# Patient Record
Sex: MALE | Race: WHITE | NOT HISPANIC OR LATINO | Employment: OTHER | URBAN - NONMETROPOLITAN AREA
[De-identification: names, ages, dates, MRNs, and addresses within clinical notes are randomized per-mention and may not be internally consistent; named-entity substitution may affect disease eponyms.]

---

## 2017-06-12 ENCOUNTER — OFFICE VISIT (OUTPATIENT)
Dept: BARIATRICS/WEIGHT MGMT | Facility: CLINIC | Age: 52
End: 2017-06-12

## 2017-06-12 ENCOUNTER — OFFICE VISIT (OUTPATIENT)
Dept: BARIATRICS/WEIGHT MGMT | Facility: HOSPITAL | Age: 52
End: 2017-06-12

## 2017-06-12 VITALS
BODY MASS INDEX: 46.65 KG/M2 | HEIGHT: 69 IN | DIASTOLIC BLOOD PRESSURE: 71 MMHG | SYSTOLIC BLOOD PRESSURE: 118 MMHG | WEIGHT: 315 LBS | OXYGEN SATURATION: 98 % | HEART RATE: 64 BPM | RESPIRATION RATE: 18 BRPM

## 2017-06-12 DIAGNOSIS — G47.33 OBSTRUCTIVE SLEEP APNEA SYNDROME: Primary | ICD-10-CM

## 2017-06-12 DIAGNOSIS — M25.50 ARTHRALGIA, UNSPECIFIED JOINT: ICD-10-CM

## 2017-06-12 DIAGNOSIS — M54.9 BACK PAIN, UNSPECIFIED BACK LOCATION, UNSPECIFIED BACK PAIN LATERALITY, UNSPECIFIED CHRONICITY: ICD-10-CM

## 2017-06-12 DIAGNOSIS — K21.9 GASTROESOPHAGEAL REFLUX DISEASE, ESOPHAGITIS PRESENCE NOT SPECIFIED: ICD-10-CM

## 2017-06-12 DIAGNOSIS — R03.0 BORDERLINE HYPERTENSION: ICD-10-CM

## 2017-06-12 DIAGNOSIS — R06.09 DOE (DYSPNEA ON EXERTION): ICD-10-CM

## 2017-06-12 DIAGNOSIS — R53.83 FATIGUE, UNSPECIFIED TYPE: ICD-10-CM

## 2017-06-12 DIAGNOSIS — I87.2 VENOUS (PERIPHERAL) INSUFFICIENCY: ICD-10-CM

## 2017-06-12 PROCEDURE — 99204 OFFICE O/P NEW MOD 45 MIN: CPT | Performed by: SURGERY

## 2017-06-12 RX ORDER — ASPIRIN 81 MG/1
81 TABLET ORAL DAILY
COMMUNITY

## 2017-06-12 RX ORDER — OMEPRAZOLE 20 MG/1
20 CAPSULE, DELAYED RELEASE ORAL WEEKLY
COMMUNITY

## 2017-06-12 RX ORDER — BISOPROLOL FUMARATE AND HYDROCHLOROTHIAZIDE 10; 6.25 MG/1; MG/1
1 TABLET ORAL DAILY
COMMUNITY

## 2017-06-12 NOTE — PROGRESS NOTES
Patient Care Team:  Mike Wesley MD as PCP - General (General Practice)    Reason for Visit:  Surgical Weight loss    Chief Complaint   Patient presents with   • Weight Loss     Subjective     Patient is a 51 y.o. male presents with morbid obesity and his Body mass index is 58.8 kg/(m^2).. Onset of symptoms was most his life.  Symptoms are associated with weight gain.    Symptoms improve with weight loss. Co morbidities include fatigue, DURAN, sleep apnea, HTN, joint pains, low back pain, GERD, borderline diabetes, Salinas's by gross exam, leg swelling  He stated he has tried multiple weight loss regimens. He stated he has lost weight while trying these methods but failed and regained weight once he stopped the diets, usually regaining more than he lost.  Felix Hernandez is now interested in pursuing weight loss surgical options because he has not been able to maintain adequate weight loss while trying these previous conservative methods for weight loss.      He was told he might have Salinas's by gross exam during EGD done about a year ago.  No biopsy was done to confirm this though.  He is leaning toward gastric bypass at this point.  He states he can lose a lot of weight with diets over a hundred pounds but can't keep it off.    Patient has had chest pain at night thought secondary to reflux and will awaken at night with it.  It seems to be related to eating before sleep.  He has had cardiac stress tests in the past which were normal.  Review of Systems  Review of Systems -   Ears: no loss of hearing, Nose: no chronic bleeding, Eyes: no blurry vision, Throat: no chronic sore throat, Neck: no hoarseness or dysphagia, Respiratory: no hemoptysis, Heart: No chronic chest pain or irregular heart beat, GI: no chronic N/V, hematemesis, chronic diarrhea or hematochezia, : No hematuria or difficulty voiding, Musculoskeletal: No weakness, or claudication, Neuro: No numbness or tingling in extremities, Skin: No  rashes or skin discoloration, Constitutional: No unexplained night sweats, fever or chills, Endocrine: No cold intolerance   Pertinent Positives include: fatigue, DURAN, sleep apnea, HTN, joint pains, low back pain, GERD, borderline diabetes, Salinas's by gross exam, leg swelling  A complete patient filled ROS was done on 6/12/17 and is in the medical records.  All other systems reviewed and are negative     History  No Known Allergies  Current Outpatient Prescriptions   Medication Sig Dispense Refill   • aspirin 81 MG EC tablet Take 81 mg by mouth Daily.     • bisoprolol-hydrochlorothiazide (ZIAC) 10-6.25 MG per tablet Take 1 tablet by mouth Daily.     • DICLOFENAC PO Take  by mouth As Needed.     • metFORMIN (GLUCOPHAGE) 1000 MG tablet Take 1,000 mg by mouth 2 (Two) Times a Day With Meals.     • omeprazole (priLOSEC) 20 MG capsule Take 20 mg by mouth Daily.       No current facility-administered medications for this visit.      Past Surgical History:   Procedure Laterality Date   • APPENDECTOMY       Past Medical History:   Diagnosis Date   • Ankle swelling    • Salinas's esophagus    • Carpal tunnel syndrome    • Generalized pain     joint, knee, carpal tunnel   • GERD (gastroesophageal reflux disease)    • Hair loss    • Heart burn    • Hypertension    • Insomnia    • Night sweats    • No energy    • Pre-diabetes    • Rapid heart beat    • Recurrent boils    • Restless leg syndrome    • Sleep apnea    • Sleep apnea with use of continuous positive airway pressure (CPAP)    • Snoring    • Weight gain      Social History   Substance Use Topics   • Smoking status: Former Smoker     Years: 5.00     Types: Cigarettes     Quit date: 1997   • Smokeless tobacco: Never Used   • Alcohol use Yes      Comment: 1-2 month     Family History   Problem Relation Age of Onset   • Obesity Mother    • Diabetes Mother 27   • Hypertension Mother    • Sleep apnea Mother    • Obesity Father    • Diabetes Father 65   • Hypertension Father     • Other Father    • Stroke Maternal Grandmother 80   • Obesity Maternal Grandfather    • Diabetes Maternal Grandfather    • Hypertension Maternal Grandfather    • Heart attack Maternal Grandfather    • Obesity Paternal Grandmother    • Diabetes Paternal Grandmother    • Hypertension Paternal Grandmother    • Heart attack Paternal Grandmother    • Other Paternal Grandmother    • Lung cancer Paternal Grandfather        Objective     Vital Signs  Heart Rate:  [64] 64  Resp:  [18] 18  BP: (118)/(71) 118/71  Body mass index is 58.8 kg/(m^2).    Physical Exam:    A&O x 3,Well developed, well nourished patient in no acute distress, with normal affect   HEENT: normocephalic, EOMI, PERRLA, mouth mucosa pink and moist, white conjunctiva, no ptosis, normal dentition for age  Neck: Full ROM, trachea midline, no thyromegaly  Respiratory: CTA with normal respiratory effort  Chest: (breast)  Cardiovascular: RRR, normal PMI  GI: Soft, nontender with no hepatospenomegaly, normal BS  Genitourinary: (hernia)  Musculoskeletal: Normal gait and station, digits with no cyanosis  Skin: No ulcers or rashes, warm and dry  Lymphatics:   Neurologic:   Psychiatric:          Results Review:   I reviewed the patient's new clinical results.  This patient will have preop blood testing including CBC with diff, CMP, INR and PT, U/A, Type and screen.  This patient will have CXR, UGI with SBFT if gastric bypass is planned.  The patient will have a preop EGD performed to check for H. Pylori and any  significant evidence of reflux esophagitis.  This patient will have an EKG and possible cardiology consult if super obese.  The patient will have screening for sleep apnea and testing will be done if appropriate.  This patient will have PFT's and ABG done as well.  If the HgbA1C is elevated the patient will be referred to endocrine as well.    Patient will also need to have cardiac, psychological, dietary, pulmonary, gastroenterological clearance.    I  have reviewed the medical chart and this is my summary.  Patient with borderline diabetes, HTN, Salinas's, GERD, sleep apnea interested in bariatric surgery. He had an appendectomy in the remote past done laparoscopically.  He states his fasting sugar was 118 recently.  He is on Metformin for weight loss and help with sugars.      Assessment/Plan   Encounter Diagnoses   Name Primary?   • Obstructive sleep apnea syndrome Yes   • Venous (peripheral) insufficiency    • Body mass index (BMI) of 50-59.9 in adult    • Gastroesophageal reflux disease, esophagitis presence not specified    • Borderline hypertension    • Fatigue, unspecified type    • DURAN (dyspnea on exertion)    • Arthralgia, unspecified joint    • Back pain, unspecified back location, unspecified back pain laterality, unspecified chronicity      These bariatric surgery patients are universally considered major, high risk open or laparosocopic surgeries with identified risk factors or co morbidites listed above.  All listed comorbidities will lead to increased operative risks as the following outlines.  This is why bariatric surgeries are considered high risk and why the co morbidities are listed even if stable. Unstable or worsening co morbidities would likely lead to postponement of surgery until they are stable.    Morbid obesity is an independent risk factor for surgery complications.  There is a higher risk of blood loss, operative time, wound infection (1.7x higher), heart attacks (5x higher), positional neuropathy (4x higher), Urinary tract infection (1.5x higher), death (2x higher).    If a patient has diabetes, there is a higher risk of infection and poor wound healing that could lead to anastomotic leaks.  There is a higher risk for these complications and for DKA in uncontrolled DM or with a Hgb A1C over 7.4. Patients with diabetes may develop PVD, CAD, nephropathy, gastropathy, neuropathy, retinopathy and strokes. It may be due to micovascular  disease from the diabetes along with atherosclerotic disease. Many of these diabetic complications will lead to higher operative risks.    Patients on NSAID are at increased risk for PUD and poor wound healing and bleeding.  This includes anastomotic leaks of all kinds which could lead to death. Many of these patient's are on these medications for DJD or arthritis or joint pains, see below.    A patient with Hypertension has a higher risk for cardiac complications including MI, CHF and also for stroke.  There is also a risk of renal failure from any drop of systolic pressure of 60 or more due to anesthetic medications or blood loss or the combination of both. Patients who are on Metformin for diabetes are also at risk for reversible kidney damage. Any organ failure can lead to higher perioperative risks. Multiple organ failure in the postoperative period can lead to death.    A patient with sleep apnea is at increased risk for sudden death and pulmonary complications.  It is estimated that 80% of morbidly obese patients have sleep apnea and many undiagnosed or not treated. Many have snoring as the lone symptom.    Patients with GERD and reflux are at increased risk for aspiration and pneumonia/bronchitis after surgery. A patient with GERD may have reactive airway disease or asthma.    Patients with venous insufficiency or varicose veins or prior history of VTE are at higher risk of VTE including DVT or PE.  Venous insufficiency and chronic swelling of the legs are common in the morbidly obese.  PE is the number one cause of death after surgery in the morbidly obese,  GI leaks is the second leading cause for death.    Patients with joint pains, low back pains, neuropathy, wheel chair bound or with prior history of stroke may have mobility issues which may limit their ability to walk in the postoperative period which would help prevent VTE including DVT and PE. Patients who are on NSAID, MYERS-2 inhibitors and aspirin  are at risk for bleeding, PUD, poor wound healing including to any anastomoses, and reversible injury to the kidney.  These patients are usually on these medication for pain.    Patients who have fatty liver from morbid obesity can develop liver dysfunction and even cirrhosis.  This can lead to coagulopathy and increased risk of bleeding.  A healthy liver is also needed for proper metabolism of many drugs including anesthetics.  The liver also produces albumin which is necessary to maintain oncotic pressure in the intravascular volume and to prevent edema which might interfere with proper healing especially for anastomoses.  Cirrhosis can lead to portal vein hypertension and varices which might cause life threatening bleeding.    Restrictive lung disease is common in the morbidly obese and  is due to poor external compliance of the chest wall due to weight.  It is associated with a decreased FVC, a normal FEV1 and normal DLCO.  It is associated with higher rates of ICU admission, possible intubation or need for tracheostomy.    Patients with a short thick neck and inability to open mouth or extend neck and a Mallampati class IV would be difficult to intubate.  This patient would be high risk for anesthetic complications.    The above listed comorbidites are considered stable, unless otherwise stated or implied as being unstable or worsening in the patients's impression/plan discussion.        This patient is interested in bariatric surgery.  He has a lot of comorbidities including metabolic syndrome (HTN, hyperlipidemia, and borderline diabetes).  Because of his Salinas's he would not be a good candidate for gastric bypass but he never had biopsy to confirm this with diagnosis by gross visual exam only.  Because of his borderline diabetes and Salians's, he would be a better candidate for gastric bypass.  He will need to complete a 6 month medically supervised diet and he will see Kathy our APRN next month.  He  will eventually need a psychological evaluation and if he has not had an EGD recently, this will need to be repeated to monitor any progression of his Salinas's and to confirm this diagnosis by biopsy.    I discussed the patients findings and my recommendations with patient and family. I will obtain any pertinent old medical records for this patient.     Abhinav Ricks MD    06/12/17  3:30 PM  Patient Care Team:  Mike Wesley MD as PCP - General (General Practice)

## 2017-06-12 NOTE — PROGRESS NOTES
"Nutrition Bariatric/[facility] Note     Visit   Initial Assessment     Anthropometrics   Height: 69\"  Weight: 398.2#  BMI: 58.8    Waist: 64.5\"  Hip: 61\"  Chest: 61.5\"  Thigh: 33\"  Arm: 17\"  Body Fat: 45.9%    Nutrition Recall  24 Hour recall:  (B) protein shake (L) fast food, unsweetened or sweet tea (D) fast food or salad w/chicken or ham, water  Eating 3 meals daily   Snacking in evening on sweets  Excessive sweet intake  Large portions  Drinking with meals   Drinking greater to or equal to 64 fluid ounces  Replacing 1 meal with protein shake daily     Exercise   None    Habits:  None    Education    Goal Setting and Information Packet    Nutrition Goals   Continue diet changes  Eat 3-4 meals per day with protein  Eat protein first at meals  Eliminate snacks  Healthier food choices  Portion control / Use smaller plate or measuring cup   Replace sugar beverages with artifical sweetened     Exercise Goals  Add 15-30 minutes of walking, cycling, elliptical, swimming, chair, yoga or crossfit daily    Maria T Mcneil RD, LD  06/12/2017  2:57 PM    "

## 2017-07-17 ENCOUNTER — OFFICE VISIT (OUTPATIENT)
Dept: BARIATRICS/WEIGHT MGMT | Facility: CLINIC | Age: 52
End: 2017-07-17

## 2017-07-17 ENCOUNTER — APPOINTMENT (OUTPATIENT)
Dept: LAB | Facility: HOSPITAL | Age: 52
End: 2017-07-17

## 2017-07-17 VITALS
OXYGEN SATURATION: 99 % | BODY MASS INDEX: 46.65 KG/M2 | DIASTOLIC BLOOD PRESSURE: 85 MMHG | HEIGHT: 69 IN | SYSTOLIC BLOOD PRESSURE: 137 MMHG | WEIGHT: 315 LBS | HEART RATE: 59 BPM

## 2017-07-17 DIAGNOSIS — Z71.89 PRE-BARIATRIC SURGERY PSYCHOLOGICAL EVALUATION: ICD-10-CM

## 2017-07-17 DIAGNOSIS — E66.9 SUPER OBESE: Primary | ICD-10-CM

## 2017-07-17 DIAGNOSIS — R53.83 FATIGUE, UNSPECIFIED TYPE: ICD-10-CM

## 2017-07-17 LAB
25(OH)D3 SERPL-MCNC: 28.1 NG/ML (ref 30–100)
TSH SERPL DL<=0.05 MIU/L-ACNC: 1.42 MIU/ML (ref 0.47–4.68)
VIT B12 BLD-MCNC: 675 PG/ML (ref 239–931)

## 2017-07-17 PROCEDURE — 82306 VITAMIN D 25 HYDROXY: CPT | Performed by: NURSE PRACTITIONER

## 2017-07-17 PROCEDURE — 99213 OFFICE O/P EST LOW 20 MIN: CPT | Performed by: NURSE PRACTITIONER

## 2017-07-17 PROCEDURE — 82607 VITAMIN B-12: CPT | Performed by: NURSE PRACTITIONER

## 2017-07-17 PROCEDURE — 84443 ASSAY THYROID STIM HORMONE: CPT | Performed by: NURSE PRACTITIONER

## 2017-07-17 PROCEDURE — 36415 COLL VENOUS BLD VENIPUNCTURE: CPT | Performed by: NURSE PRACTITIONER

## 2017-07-17 NOTE — PATIENT INSTRUCTIONS
Felix Hernandez has done well this month with healthy changes. Patient has lost 7 pounds. Today we discussed healthy changes in lifestyle, diet, and exercise.   Handout provided on portion sizes/control.   Intensive behavioral therapy for obesity was done today.   Goals for this month are: 3 meals per day with protein at each; eat protein first, use smaller plate; exercise as advised.  Keep trying to re-obtain CPAP machine for [facility].  Obtain labs today. Office will call with results.   Psych referral made today for clearance prior to surgery.  Will need cardiac clearance prior to surgery (age over 50 and BMI over 50).  Follow up in one month for a weight recheck.

## 2017-07-17 NOTE — PROGRESS NOTES
"Subjective   Felix Hernandez is a 51 y.o. male.     History of Present Illness   Felix Hernandez is here with morbid obesity and desires to have surgery for weight loss. This is the patient's 2nd visit to the office.  Patient will be given a referral today for psych clearance prior to surgery.  Patient has been more active this month. 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 Patient has been making health dietary choices and eating 3 meals per day with protein at each. Patient has been getting about 70 grams of protein per day. Patient is drinking 64 ounces of water per day.   Vitals:    07/17/17 1444   BP: 137/85   BP Location: Right arm   Patient Position: Sitting   Cuff Size: Adult   Pulse: 59   SpO2: 99%   Weight: (!) 391 lb 12.8 oz (178 kg)   Height: 69\" (175.3 cm)         The following portions of the patient's history were reviewed and updated as appropriate: allergies, current medications, past family history, past medical history, past social history, past surgical history and problem list.    Review of Systems   Constitutional: Negative for activity change, appetite change and fatigue.   HENT: Negative.    Eyes: Negative.    Respiratory: Positive for apnea. Negative for cough, chest tightness and shortness of breath.         Is in process of getting his CPAP machine back   Cardiovascular: Positive for leg swelling. Negative for chest pain and palpitations.   Gastrointestinal: Negative.  Negative for abdominal pain, anal bleeding, constipation, nausea and vomiting.        Heartburn; may or may not have Salinas's esophagus   Endocrine: Negative.    Genitourinary: Negative.    Musculoskeletal: Positive for arthralgias. Negative for back pain.        Knee pain; just had steroid injection into knee; needs knee replacement   Skin: Negative.    Allergic/Immunologic: Negative.    Neurological: Negative.  Negative for seizures and syncope.   Hematological: Negative.  " Does not bruise/bleed easily.   Psychiatric/Behavioral: Positive for sleep disturbance. Negative for dysphoric mood and self-injury.       Objective   Physical Exam   Constitutional: He is oriented to person, place, and time. Vital signs are normal. He appears well-developed and well-nourished. He is cooperative. No distress.   HENT:   Head: Normocephalic and atraumatic.   Nose: Nose normal.   Mouth/Throat: Oropharynx is clear and moist. No oropharyngeal exudate or tonsillar abscesses.   Eyes: Conjunctivae, EOM and lids are normal. Pupils are equal, round, and reactive to light. Right eye exhibits no discharge. Left eye exhibits no discharge.   Neck: Trachea normal. Neck supple. No JVD present. Carotid bruit is not present. No rigidity. No tracheal deviation present. No thyromegaly present.   Cardiovascular: Normal rate, regular rhythm, S1 normal, S2 normal and normal heart sounds.    Pulmonary/Chest: Effort normal. No stridor. No respiratory distress. He has decreased breath sounds in the right upper field, the right middle field, the right lower field, the left upper field, the left middle field and the left lower field. He has no wheezes. He has no rales.   Abdominal: Soft. Bowel sounds are normal. He exhibits no distension. There is no tenderness.   Obese; healed scar noted   Musculoskeletal: He exhibits no edema.        Right shoulder: He exhibits normal strength.   Lymphadenopathy:     He has no cervical adenopathy.   Neurological: He is alert and oriented to person, place, and time. He has normal strength. No cranial nerve deficit.   Skin: Skin is warm, dry and intact. No rash noted.   Psychiatric: He has a normal mood and affect. His speech is normal and behavior is normal.   Alert and oriented x 3   Vitals reviewed.      Assessment/Plan   Felix was seen today for follow-up.    Diagnoses and all orders for this visit:    Super obese  -     Vitamin B12  -     Vitamin D 25 Hydroxy  -     TSH  -     Bariatric  Nutritional Counseling; Standing  -     Ambulatory Referral to Psychology    Pre-bariatric surgery psychological evaluation  -     Vitamin B12  -     Vitamin D 25 Hydroxy  -     TSH  -     Ambulatory Referral to Psychology    Fatigue, unspecified type  -     Vitamin B12  -     Vitamin D 25 Hydroxy  -     TSH          Felix Hernandez has done well this month with healthy changes. Patient has lost 7 pounds. Today we discussed healthy changes in lifestyle, diet, and exercise.   Handout provided on portion sizes/control.   Intensive behavioral therapy for obesity was done today.   Goals for this month are: 3 meals per day with protein at each; eat protein first, use smaller plate; exercise as advised.  Keep trying to re-obtain CPAP machine for SHANTELLE.  Obtain labs today. Office will call with results.   Psych referral made today for clearance prior to surgery.  Will need cardiac clearance prior to surgery (age over 50 and BMI over 50).  Follow up in one month for a weight recheck.

## 2017-07-18 DIAGNOSIS — E55.9 VITAMIN D INSUFFICIENCY: Primary | ICD-10-CM

## 2017-07-18 RX ORDER — ERGOCALCIFEROL 1.25 MG/1
50000 CAPSULE ORAL WEEKLY
Qty: 4 CAPSULE | Refills: 3 | Status: SHIPPED | OUTPATIENT
Start: 2017-07-18 | End: 2017-10-16 | Stop reason: SDUPTHER

## 2017-08-11 ENCOUNTER — RESULTS ENCOUNTER (OUTPATIENT)
Dept: BARIATRICS/WEIGHT MGMT | Facility: CLINIC | Age: 52
End: 2017-08-11

## 2017-08-11 DIAGNOSIS — E66.9 SUPER OBESE: ICD-10-CM

## 2017-08-21 ENCOUNTER — OFFICE VISIT (OUTPATIENT)
Dept: BARIATRICS/WEIGHT MGMT | Facility: CLINIC | Age: 52
End: 2017-08-21

## 2017-08-21 VITALS
BODY MASS INDEX: 46.65 KG/M2 | WEIGHT: 315 LBS | OXYGEN SATURATION: 99 % | SYSTOLIC BLOOD PRESSURE: 140 MMHG | DIASTOLIC BLOOD PRESSURE: 89 MMHG | HEIGHT: 69 IN | TEMPERATURE: 97.5 F | HEART RATE: 65 BPM

## 2017-08-21 PROCEDURE — 99213 OFFICE O/P EST LOW 20 MIN: CPT | Performed by: SURGERY

## 2017-08-21 NOTE — PROGRESS NOTES
Patient Care Team:  Mike Wesley MD as PCP - General (General Practice)    Reason for Visit:  Surgical Weight loss    Subjective     Patient is a 51 y.o. male presents with morbid obesity and his Body mass index is 58.45 kg/(m^2)..     He is here for discussion of surgical weight loss options.  He stated he has been with the disease of obesity for year(s).  He stated he suffers from hypertension, sleep apnea and reflux due to his weight gain.  He stated that he loss helps alleviate these symptoms.  He admits to losing approximately 100 pounds however regaining it over time.   He stated that he has tried multiple diet regimens including currently participating in a medically supervised weight loss program to help with weight loss.  He stated that he has attempted these conservative methods for weight loss without maintaining long term success.  Today he would like to discuss surgical weight loss options such as the Laparoscopic Sleeve Gastrectomy or the Laparoscopic R - Y Gastric Bypass.     Review of Systems  General ROS: positive for  - weight gain  Psychological ROS: positive for - sleep disturbances  Respiratory ROS: no cough, shortness of breath, or wheezing  Cardiovascular ROS: no chest pain or dyspnea on exertion  Gastrointestinal ROS: no abdominal pain, change in bowel habits, or black or bloody stools  positive for - heartburn  Musculoskeletal ROS: positive for - joint pain and pain in back - lower  Neurological ROS: no TIA or stroke symptoms    History  Past Medical History:   Diagnosis Date   • Ankle swelling    • Garsia's esophagus     possible garsia's, but nothing definitive; he is having follow up regarding this   • Carpal tunnel syndrome    • Generalized pain     joint, knee, carpal tunnel   • GERD (gastroesophageal reflux disease)    • Hair loss    • Heart burn    • Hypertension    • Insomnia    • Night sweats    • No energy    • Pre-diabetes    • Rapid heart beat    • Recurrent boils    •  Restless leg syndrome    • Sleep apnea    • Sleep apnea with use of continuous positive airway pressure (CPAP)    • Snoring    • Weight gain      Past Surgical History:   Procedure Laterality Date   • APPENDECTOMY      lap     Family History   Problem Relation Age of Onset   • Obesity Mother    • Diabetes Mother 27   • Hypertension Mother    • Sleep apnea Mother    • Obesity Father    • Diabetes Father 65   • Hypertension Father    • Other Father    • Stroke Maternal Grandmother 80   • Obesity Maternal Grandfather    • Diabetes Maternal Grandfather    • Hypertension Maternal Grandfather    • Heart attack Maternal Grandfather    • Obesity Paternal Grandmother    • Diabetes Paternal Grandmother    • Hypertension Paternal Grandmother    • Heart attack Paternal Grandmother    • Other Paternal Grandmother    • Lung cancer Paternal Grandfather      Social History   Substance Use Topics   • Smoking status: Former Smoker     Years: 5.00     Types: Cigarettes     Quit date: 1997   • Smokeless tobacco: Never Used   • Alcohol use 1.2 oz/week     2 Cans of beer per week      Comment: 1-2 month       (Not in a hospital admission)  Allergies:  Review of patient's allergies indicates no known allergies.    Objective     Vital Signs  Temp:  [97.5 °F (36.4 °C)] 97.5 °F (36.4 °C)  Heart Rate:  [65] 65  BP: (140)/(89) 140/89  Body mass index is 58.45 kg/(m^2).  Last 3 weights    08/21/17  0850   Weight: (!) 395 lb 12.8 oz (180 kg)       Physical Exam:     HEENT: extra ocular movement intact  Respiratory: appears well, vitals normal, no respiratory distress, acyanotic, normal RR, chest clear, no wheezing, crepitations, rhonchi, normal symmetric air entry  Cardiovascular: Regular rate and rhythm, S1, S2 normal, no murmur, click, rub or gallop  GI: Soft, non-tender, normal bowel sounds; no bruits, organomegaly or masses.  Abnormal shape: obese         Results Review:   None        Assessment/Plan   Encounter Diagnoses   Name Primary?   •  Body mass index (BMI) of 50-59.9 in adult Yes       1.  I believe this patient will be a good candidate for weight loss surgery.  I have discussed the Felix - Y Gastric Bypass, laparoscopic sleeve gastrectomy and the Laparoscopic Gastric Band procedures.  We discussed the benefits of the surgeries including the benefit of weight loss and the possible reversal of co-morbid conditions associated with morbid obesity. I explained to the patient that prior to making a definitive decision on the type of surgery he will require an esophagogastroduodenoscopy.  The alternatives  include not doing anything, or pursuing an UGI series which only offers a diagnosis with potential less accuracy compared to EGD. The benefits of the EGD such as identifying the pathology and anatomy of the upper GI system and the complications and risks of the procedure.  The risk of the endoscopy were discussed in detail. We discussed the risk of perforation (one out of 6794-8154, riskier with dilation), bleeding (one out of 500), and the rare risks of infection, adverse reaction to anesthesia, respiratory failure, cardiac failure including MI and adverse reaction to medications, etc. We discussed consequences that could occur if a risk were to develop such as the need for hospitalization, blood transfusion, surgical intervention, medications, pain, disability and death. The patient verbalizes understanding and agrees to proceed. such as bleeding, perforation, swallowing difficulties and gas bloat can occur after this procedure.  Upon completion of our discussion and addressing and answering his questions to his satisfation, informed consent was obtained.  He will be scheduled accordingly for the esophagogastroduodenoscopy procedure.    I discussed the patients findings and my recommendations with patient and his wife.     Dr. Jorge Jasso MD [facility]    08/21/17  10:32 AM  Patient Care Team:  Mike Wesley MD as PCP - General (General Practice)

## 2017-08-28 ENCOUNTER — OFFICE VISIT (OUTPATIENT)
Dept: PSYCHIATRY | Age: 52
End: 2017-08-28
Payer: MEDICAID

## 2017-08-28 VITALS
HEART RATE: 71 BPM | HEIGHT: 69 IN | SYSTOLIC BLOOD PRESSURE: 145 MMHG | BODY MASS INDEX: 46.65 KG/M2 | DIASTOLIC BLOOD PRESSURE: 82 MMHG | OXYGEN SATURATION: 96 % | WEIGHT: 315 LBS

## 2017-08-28 DIAGNOSIS — F43.20 ADJUSTMENT DISORDER, UNSPECIFIED TYPE: Primary | ICD-10-CM

## 2017-08-28 PROCEDURE — 99999 PR OFFICE/OUTPT VISIT,PROCEDURE ONLY: CPT | Performed by: COUNSELOR

## 2017-08-28 PROCEDURE — 90791 PSYCH DIAGNOSTIC EVALUATION: CPT | Performed by: COUNSELOR

## 2017-08-28 RX ORDER — OMEPRAZOLE 20 MG/1
20 CAPSULE, DELAYED RELEASE ORAL
COMMUNITY

## 2017-08-28 RX ORDER — BISOPROLOL FUMARATE AND HYDROCHLOROTHIAZIDE 10; 6.25 MG/1; MG/1
1 TABLET ORAL
COMMUNITY

## 2017-09-08 ENCOUNTER — RESULTS ENCOUNTER (OUTPATIENT)
Dept: BARIATRICS/WEIGHT MGMT | Facility: CLINIC | Age: 52
End: 2017-09-08

## 2017-09-08 DIAGNOSIS — E66.9 SUPER OBESE: ICD-10-CM

## 2017-09-12 ENCOUNTER — ANESTHESIA (OUTPATIENT)
Dept: GASTROENTEROLOGY | Facility: HOSPITAL | Age: 52
End: 2017-09-12

## 2017-09-12 ENCOUNTER — HOSPITAL ENCOUNTER (OUTPATIENT)
Facility: HOSPITAL | Age: 52
Setting detail: HOSPITAL OUTPATIENT SURGERY
Discharge: HOME | End: 2017-09-12
Attending: SURGERY | Admitting: SURGERY

## 2017-09-12 ENCOUNTER — ANESTHESIA EVENT (OUTPATIENT)
Dept: GASTROENTEROLOGY | Facility: HOSPITAL | Age: 52
End: 2017-09-12

## 2017-09-12 VITALS
TEMPERATURE: 97.4 F | DIASTOLIC BLOOD PRESSURE: 91 MMHG | HEART RATE: 78 BPM | OXYGEN SATURATION: 99 % | SYSTOLIC BLOOD PRESSURE: 151 MMHG | HEIGHT: 69 IN | RESPIRATION RATE: 14 BRPM | WEIGHT: 315 LBS | BODY MASS INDEX: 46.65 KG/M2

## 2017-09-12 PROCEDURE — 43239 EGD BIOPSY SINGLE/MULTIPLE: CPT | Performed by: SURGERY

## 2017-09-12 PROCEDURE — 88305 TISSUE EXAM BY PATHOLOGIST: CPT | Performed by: SURGERY

## 2017-09-12 PROCEDURE — 25010000002 PROPOFOL 10 MG/ML EMULSION: Performed by: NURSE ANESTHETIST, CERTIFIED REGISTERED

## 2017-09-12 PROCEDURE — 87081 CULTURE SCREEN ONLY: CPT | Performed by: SURGERY

## 2017-09-12 RX ORDER — SODIUM CHLORIDE 9 MG/ML
100 INJECTION, SOLUTION INTRAVENOUS CONTINUOUS
Status: CANCELLED | OUTPATIENT
Start: 2017-09-12

## 2017-09-12 RX ORDER — SODIUM CHLORIDE 0.9 % (FLUSH) 0.9 %
3 SYRINGE (ML) INJECTION AS NEEDED
Status: DISCONTINUED | OUTPATIENT
Start: 2017-09-12 | End: 2017-09-12 | Stop reason: HOSPADM

## 2017-09-12 RX ORDER — SODIUM CHLORIDE 9 MG/ML
500 INJECTION, SOLUTION INTRAVENOUS CONTINUOUS PRN
Status: DISCONTINUED | OUTPATIENT
Start: 2017-09-12 | End: 2017-09-12 | Stop reason: HOSPADM

## 2017-09-12 RX ORDER — PROPOFOL 10 MG/ML
VIAL (ML) INTRAVENOUS AS NEEDED
Status: DISCONTINUED | OUTPATIENT
Start: 2017-09-12 | End: 2017-09-12 | Stop reason: SURG

## 2017-09-12 RX ORDER — LIDOCAINE HYDROCHLORIDE 20 MG/ML
INJECTION, SOLUTION INFILTRATION; PERINEURAL AS NEEDED
Status: DISCONTINUED | OUTPATIENT
Start: 2017-09-12 | End: 2017-09-12 | Stop reason: SURG

## 2017-09-12 RX ORDER — SODIUM CHLORIDE 0.9 % (FLUSH) 0.9 %
1-10 SYRINGE (ML) INJECTION AS NEEDED
Status: CANCELLED | OUTPATIENT
Start: 2017-09-12

## 2017-09-12 RX ORDER — METOPROLOL TARTRATE 5 MG/5ML
2 INJECTION INTRAVENOUS ONCE AS NEEDED
Status: COMPLETED | OUTPATIENT
Start: 2017-09-12 | End: 2017-09-12

## 2017-09-12 RX ADMIN — LIDOCAINE HYDROCHLORIDE 40 MG: 20 INJECTION, SOLUTION INFILTRATION; PERINEURAL at 09:19

## 2017-09-12 RX ADMIN — METOPROLOL TARTRATE: 1 INJECTION, SOLUTION INTRAVENOUS at 08:44

## 2017-09-12 RX ADMIN — SODIUM CHLORIDE 500 ML: 9 INJECTION, SOLUTION INTRAVENOUS at 08:44

## 2017-09-12 RX ADMIN — PROPOFOL 50 MG: 10 INJECTION, EMULSION INTRAVENOUS at 09:20

## 2017-09-12 RX ADMIN — PROPOFOL 30 MG: 10 INJECTION, EMULSION INTRAVENOUS at 09:25

## 2017-09-12 RX ADMIN — PROPOFOL 50 MG: 10 INJECTION, EMULSION INTRAVENOUS at 09:22

## 2017-09-12 RX ADMIN — PROPOFOL 40 MG: 10 INJECTION, EMULSION INTRAVENOUS at 09:24

## 2017-09-12 NOTE — PLAN OF CARE
Problem: Patient Care Overview (Adult)  Goal: Plan of Care Review  Outcome: Ongoing (interventions implemented as appropriate)    09/12/17 0916   Patient Care Overview   Progress improving   Outcome Evaluation   Outcome Summary/Follow up Plan no noted problems

## 2017-09-12 NOTE — PLAN OF CARE
Problem: Patient Care Overview (Adult)  Goal: Plan of Care Review  Outcome: Outcome(s) achieved Date Met:  09/12/17 09/12/17 0942   Patient Care Overview   Progress improving   Outcome Evaluation   Outcome Summary/Follow up Plan D/C CRITERIA MET   Coping/Psychosocial Response Interventions   Plan Of Care Reviewed With patient;spouse       Goal: Adult Individualization and Mutuality  Outcome: Outcome(s) achieved Date Met:  09/12/17

## 2017-09-12 NOTE — ANESTHESIA PREPROCEDURE EVALUATION
Anesthesia Evaluation     Patient summary reviewed   no history of anesthetic complications:  NPO Solid Status: > 8 hours  NPO Liquid Status: > 8 hours     Airway   Mallampati: III  TM distance: >3 FB  Neck ROM: full  no difficulty expected  Dental          Pulmonary    (+) shortness of breath, sleep apnea on CPAP,   (-) asthma, not a smoker  Cardiovascular     Patient on routine beta blocker and Beta blocker not taken-may be given intraoperatively    (+) hypertension, DURAN,       Neuro/Psych  (-) seizures, TIA, CVA  GI/Hepatic/Renal/Endo    (+)  GERD, diabetes mellitus,     Musculoskeletal     (+) back pain,   Abdominal    Substance History      OB/GYN          Other                                        Anesthesia Plan    ASA 3     general   total IV anesthesia  intravenous induction   Anesthetic plan and risks discussed with patient.

## 2017-09-12 NOTE — BRIEF OP NOTE
ESOPHAGOGASTRODUODENOSCOPY WITH ANESTHESIA  Procedure Note    Felix Hernandez  9/12/2017    Pre-op Diagnosis:   Body mass index (BMI) of 50-59.9 in adult [Z68.43]    Post-op Diagnosis:     Post-Op Diagnosis Codes:     * Body mass index (BMI) of 50-59.9 in adult [Z68.43]    Procedure/CPT® Codes:      Procedure(s):  ESOPHAGOGASTRODUODENOSCOPY WITH ANESTHESIA    Surgeon(s):  Jorge Jasso MD    Anesthesia: Sedation    Staff:   Endo Technician: Karl Farris  Endo Nurse: Lucero Jeffery RN    Estimated Blood Loss: *No blood loss documented*  Urine Voided: * No values recorded between 9/12/2017  9:16 AM and 9/12/2017  9:24 AM *    Specimens:                  ID Type Source Tests Collected by Time Destination   1 : gerson test Tissue Stomach UREASE FOR H PYLORI, 24 HR Jorge Jasso MD 9/12/2017 0906    A : GE junction bx Tissue Esophagus TISSUE EXAM Jorge Jasso MD 9/12/2017 0907          Findings: wnl    Complications: none      Jorge Jasso MD     Date: 9/12/2017  Time: 9:28 AM

## 2017-09-12 NOTE — PLAN OF CARE
Problem: GI Endoscopy (Adult)  Goal: Signs and Symptoms of Listed Potential Problems Will be Absent or Manageable (GI Endoscopy)  Outcome: Outcome(s) achieved Date Met:  09/12/17

## 2017-09-12 NOTE — ANESTHESIA POSTPROCEDURE EVALUATION
Patient: Felix Hernandez    Procedure Summary     Date Anesthesia Start Anesthesia Stop Room / Location    09/12/17 0918 0926 [facility] ENDOSCOPY 5 / [facility]       Procedure Diagnosis Surgeon Provider    ESOPHAGOGASTRODUODENOSCOPY WITH ANESTHESIA (N/A Esophagus) Body mass index (BMI) of 50-59.9 in adult  (Body mass index (BMI) of 50-59.9 in adult [Z68.43]) MD Marek Butts CRNA          Anesthesia Type: general  Last vitals  BP        Temp        Pulse       Resp        SpO2          Post Anesthesia Care and Evaluation    Patient location during evaluation: PHASE II  Patient participation: complete - patient participated  Level of consciousness: awake and alert  Pain score: 0  Pain management: adequate  Airway patency: patent  Anesthetic complications: No anesthetic complications  PONV Status: none  Cardiovascular status: acceptable  Respiratory status: acceptable  Hydration status: acceptable  No anesthesia care post op

## 2017-09-12 NOTE — H&P (VIEW-ONLY)
Patient Care Team:  Mike Wesley MD as PCP - General (General Practice)    Reason for Visit:  Surgical Weight loss    Subjective     Patient is a 51 y.o. male presents with morbid obesity and his Body mass index is 58.45 kg/(m^2)..     He is here for discussion of surgical weight loss options.  He stated he has been with the disease of obesity for year(s).  He stated he suffers from hypertension, sleep apnea and reflux due to his weight gain.  He stated that he loss helps alleviate these symptoms.  He admits to losing approximately 100 pounds however regaining it over time.   He stated that he has tried multiple diet regimens including currently participating in a medically supervised weight loss program to help with weight loss.  He stated that he has attempted these conservative methods for weight loss without maintaining long term success.  Today he would like to discuss surgical weight loss options such as the Laparoscopic Sleeve Gastrectomy or the Laparoscopic R - Y Gastric Bypass.     Review of Systems  General ROS: positive for  - weight gain  Psychological ROS: positive for - sleep disturbances  Respiratory ROS: no cough, shortness of breath, or wheezing  Cardiovascular ROS: no chest pain or dyspnea on exertion  Gastrointestinal ROS: no abdominal pain, change in bowel habits, or black or bloody stools  positive for - heartburn  Musculoskeletal ROS: positive for - joint pain and pain in back - lower  Neurological ROS: no TIA or stroke symptoms    History  Past Medical History:   Diagnosis Date   • Ankle swelling    • Garsia's esophagus     possible garsia's, but nothing definitive; he is having follow up regarding this   • Carpal tunnel syndrome    • Generalized pain     joint, knee, carpal tunnel   • GERD (gastroesophageal reflux disease)    • Hair loss    • Heart burn    • Hypertension    • Insomnia    • Night sweats    • No energy    • Pre-diabetes    • Rapid heart beat    • Recurrent boils    •  Restless leg syndrome    • Sleep apnea    • Sleep apnea with use of continuous positive airway pressure (CPAP)    • Snoring    • Weight gain      Past Surgical History:   Procedure Laterality Date   • APPENDECTOMY      lap     Family History   Problem Relation Age of Onset   • Obesity Mother    • Diabetes Mother 27   • Hypertension Mother    • Sleep apnea Mother    • Obesity Father    • Diabetes Father 65   • Hypertension Father    • Other Father    • Stroke Maternal Grandmother 80   • Obesity Maternal Grandfather    • Diabetes Maternal Grandfather    • Hypertension Maternal Grandfather    • Heart attack Maternal Grandfather    • Obesity Paternal Grandmother    • Diabetes Paternal Grandmother    • Hypertension Paternal Grandmother    • Heart attack Paternal Grandmother    • Other Paternal Grandmother    • Lung cancer Paternal Grandfather      Social History   Substance Use Topics   • Smoking status: Former Smoker     Years: 5.00     Types: Cigarettes     Quit date: 1997   • Smokeless tobacco: Never Used   • Alcohol use 1.2 oz/week     2 Cans of beer per week      Comment: 1-2 month       (Not in a hospital admission)  Allergies:  Review of patient's allergies indicates no known allergies.    Objective     Vital Signs  Temp:  [97.5 °F (36.4 °C)] 97.5 °F (36.4 °C)  Heart Rate:  [65] 65  BP: (140)/(89) 140/89  Body mass index is 58.45 kg/(m^2).  Last 3 weights    08/21/17  0850   Weight: (!) 395 lb 12.8 oz (180 kg)       Physical Exam:     HEENT: extra ocular movement intact  Respiratory: appears well, vitals normal, no respiratory distress, acyanotic, normal RR, chest clear, no wheezing, crepitations, rhonchi, normal symmetric air entry  Cardiovascular: Regular rate and rhythm, S1, S2 normal, no murmur, click, rub or gallop  GI: Soft, non-tender, normal bowel sounds; no bruits, organomegaly or masses.  Abnormal shape: obese         Results Review:   None        Assessment/Plan   Encounter Diagnoses   Name Primary?   •  Body mass index (BMI) of 50-59.9 in adult Yes       1.  I believe this patient will be a good candidate for weight loss surgery.  I have discussed the Felix - Y Gastric Bypass, laparoscopic sleeve gastrectomy and the Laparoscopic Gastric Band procedures.  We discussed the benefits of the surgeries including the benefit of weight loss and the possible reversal of co-morbid conditions associated with morbid obesity. I explained to the patient that prior to making a definitive decision on the type of surgery he will require an esophagogastroduodenoscopy.  The alternatives  include not doing anything, or pursuing an UGI series which only offers a diagnosis with potential less accuracy compared to EGD. The benefits of the EGD such as identifying the pathology and anatomy of the upper GI system and the complications and risks of the procedure.  The risk of the endoscopy were discussed in detail. We discussed the risk of perforation (one out of 4760-2397, riskier with dilation), bleeding (one out of 500), and the rare risks of infection, adverse reaction to anesthesia, respiratory failure, cardiac failure including MI and adverse reaction to medications, etc. We discussed consequences that could occur if a risk were to develop such as the need for hospitalization, blood transfusion, surgical intervention, medications, pain, disability and death. The patient verbalizes understanding and agrees to proceed. such as bleeding, perforation, swallowing difficulties and gas bloat can occur after this procedure.  Upon completion of our discussion and addressing and answering his questions to his satisfation, informed consent was obtained.  He will be scheduled accordingly for the esophagogastroduodenoscopy procedure.    I discussed the patients findings and my recommendations with patient and his wife.     Dr. Jorge Jasso MD [facility]    08/21/17  10:32 AM  Patient Care Team:  Mike Wesley MD as PCP - General (General Practice)

## 2017-09-13 LAB
CYTO UR: NORMAL
LAB AP CASE REPORT: NORMAL
LAB AP CLINICAL INFORMATION: NORMAL
Lab: NORMAL
PATH REPORT.FINAL DX SPEC: NORMAL
PATH REPORT.GROSS SPEC: NORMAL
UREASE TISS QL: NEGATIVE

## 2017-09-18 ENCOUNTER — OFFICE VISIT (OUTPATIENT)
Dept: BARIATRICS/WEIGHT MGMT | Facility: CLINIC | Age: 52
End: 2017-09-18

## 2017-09-18 VITALS
DIASTOLIC BLOOD PRESSURE: 76 MMHG | SYSTOLIC BLOOD PRESSURE: 130 MMHG | OXYGEN SATURATION: 99 % | BODY MASS INDEX: 46.65 KG/M2 | WEIGHT: 315 LBS | HEIGHT: 69 IN | TEMPERATURE: 96.8 F | HEART RATE: 83 BPM

## 2017-09-18 DIAGNOSIS — G47.33 OBSTRUCTIVE SLEEP APNEA SYNDROME: ICD-10-CM

## 2017-09-18 DIAGNOSIS — K21.9 GASTROESOPHAGEAL REFLUX DISEASE, ESOPHAGITIS PRESENCE NOT SPECIFIED: ICD-10-CM

## 2017-09-18 PROCEDURE — 99212 OFFICE O/P EST SF 10 MIN: CPT | Performed by: SURGERY

## 2017-09-18 NOTE — PROGRESS NOTES
Patient Care Team:  Mike Wesley MD as PCP - General (General Practice)    Reason for Visit:  Surgical Weight loss and Upper endoscopy follow up visit     Subjective     Patient is a 51 y.o. male presents with morbid obesity and his Body mass index is 55.08 kg/(m^2)..     He has completed his upper endoscopy and is interested in pursuing weight loss surgical options.   He is her to discuss surgical weight loss options such as the Laparoscopic Sleeve Gastrectomy or the Laparoscopic R - Y Gastric Bypass.     Review of Systems  General ROS: positive for  - weight loss  Respiratory ROS: no cough, shortness of breath, or wheezing  Cardiovascular ROS: no chest pain or dyspnea on exertion  positive for - rapid heart rate  Gastrointestinal ROS: no abdominal pain, change in bowel habits, or black or bloody stools  Musculoskeletal ROS: positive for - joint pain    History  Past Medical History:   Diagnosis Date   • Ankle swelling    • Garsia's esophagus     possible garsia's, but nothing definitive; he is having follow up regarding this   • Carpal tunnel syndrome    • Generalized pain     joint, knee, carpal tunnel   • GERD (gastroesophageal reflux disease)    • Hair loss    • Heart burn    • Hypertension    • Insomnia    • Night sweats    • No energy    • Pre-diabetes    • Rapid heart beat    • Recurrent boils    • Restless leg syndrome    • Sleep apnea    • Sleep apnea with use of continuous positive airway pressure (CPAP)    • Snoring    • Weight gain      Past Surgical History:   Procedure Laterality Date   • APPENDECTOMY      lap   • ENDOSCOPY N/A 9/12/2017    Procedure: ESOPHAGOGASTRODUODENOSCOPY WITH ANESTHESIA;  Surgeon: Jorge Jasso MD;  Location: [facility] ENDOSCOPY;  Service:      Family History   Problem Relation Age of Onset   • Obesity Mother    • Diabetes Mother 27   • Hypertension Mother    • Sleep apnea Mother    • Obesity Father    • Diabetes Father 65   • Hypertension Father    • Other Father    •  Stroke Maternal Grandmother 80   • Obesity Maternal Grandfather    • Diabetes Maternal Grandfather    • Hypertension Maternal Grandfather    • Heart attack Maternal Grandfather    • Obesity Paternal Grandmother    • Diabetes Paternal Grandmother    • Hypertension Paternal Grandmother    • Heart attack Paternal Grandmother    • Other Paternal Grandmother    • Lung cancer Paternal Grandfather      Social History   Substance Use Topics   • Smoking status: Former Smoker     Years: 5.00     Types: Cigarettes     Quit date: 1997   • Smokeless tobacco: Never Used   • Alcohol use 1.2 oz/week     2 Cans of beer per week      Comment: 1-2 month       (Not in a hospital admission)  Allergies:  Review of patient's allergies indicates no known allergies.    Objective     Vital Signs  Temp:  [96.8 °F (36 °C)] 96.8 °F (36 °C)  Heart Rate:  [83] 83  BP: (130)/(76) 130/76  Body mass index is 55.08 kg/(m^2).    Physical Exam:     HEENT: extra ocular movement intact  Respiratory: appears well, vitals normal, no respiratory distress, acyanotic, normal RR, chest clear, no wheezing, crepitations, rhonchi, normal symmetric air entry  Cardiovascular: Regular rate and rhythm, S1, S2 normal, no murmur, click, rub or gallop  GI: Soft, non-tender, normal bowel sounds; no bruits, organomegaly or masses.  Abnormal shape: obese  Musculoskeletal: inspection - no abnormality  Neurologic: alert, oriented, normal speech, no focal findings or movement disorder noted       Results Review:   I reviewed the patient's new clinical results.        Assessment/Plan   Encounter Diagnoses   Name Primary?   • Body mass index (BMI) of 50-59.9 in adult Yes   • Obstructive sleep apnea syndrome    • Gastroesophageal reflux disease, esophagitis presence not specified        1.  I believe this patient will be a good candidate for weight loss surgery.    He has chosen laparoscopic sleeve gastrectomy. I agree with this decision.  I have discussed the Felix - Y Gastric  Bypass, laparoscopic sleeve gastrectomy and the Laparoscopic Gastric Band procedures to provide the alternatives which includes non surgical weight loss options as well.  We discussed the benefits of the surgeries including the benefit of weight loss and the possible reversal of co-morbid conditions associated with morbid obesity.  We discussed the complications and risks which include the risk of perforation, leakage,bleeding, intra-abdominal organ injury, stenosis or ulcerations, the risk of deep vein thrombosis formation leading to possible pulmonary embolisms, the risk of death.  I explained to the patient the possibility that this procedure may not be performed laparoscopically and may require being converted to an opened procedure or aborted due to abnormal anatomy.  Also postoperatively there is a risk of increased GERD symptoms after this procedure.  Upon completion of our discussion and addressing and answering his questions to his satisfation, informed consent was obtained.   He will be scheduled accordingly for a laparoscopic Sleeve gastrectomy procedure.        I discussed the patients findings and my recommendations with patient.     Dr. Jorge Jasso MD [facility]    09/18/17  9:21 AM  Patient Care Team:  Mike Wesley MD as PCP - General (General Practice)

## 2017-10-06 ENCOUNTER — RESULTS ENCOUNTER (OUTPATIENT)
Dept: BARIATRICS/WEIGHT MGMT | Facility: CLINIC | Age: 52
End: 2017-10-06

## 2017-10-06 DIAGNOSIS — E66.9 SUPER OBESE: ICD-10-CM

## 2017-10-16 ENCOUNTER — APPOINTMENT (OUTPATIENT)
Dept: LAB | Facility: HOSPITAL | Age: 52
End: 2017-10-16
Attending: NURSE PRACTITIONER

## 2017-10-16 ENCOUNTER — TELEPHONE (OUTPATIENT)
Dept: BARIATRICS/WEIGHT MGMT | Facility: CLINIC | Age: 52
End: 2017-10-16

## 2017-10-16 ENCOUNTER — OFFICE VISIT (OUTPATIENT)
Dept: BARIATRICS/WEIGHT MGMT | Facility: CLINIC | Age: 52
End: 2017-10-16

## 2017-10-16 VITALS
WEIGHT: 315 LBS | HEART RATE: 76 BPM | TEMPERATURE: 96.9 F | OXYGEN SATURATION: 99 % | BODY MASS INDEX: 46.65 KG/M2 | SYSTOLIC BLOOD PRESSURE: 123 MMHG | DIASTOLIC BLOOD PRESSURE: 69 MMHG | HEIGHT: 69 IN

## 2017-10-16 DIAGNOSIS — Z01.818 PRE-OP EVALUATION: ICD-10-CM

## 2017-10-16 DIAGNOSIS — G47.33 OBSTRUCTIVE SLEEP APNEA SYNDROME: ICD-10-CM

## 2017-10-16 DIAGNOSIS — I10 ESSENTIAL HYPERTENSION: ICD-10-CM

## 2017-10-16 DIAGNOSIS — E66.01 OBESITY, MORBID, BMI 50 OR HIGHER (HCC): Primary | ICD-10-CM

## 2017-10-16 DIAGNOSIS — E55.9 VITAMIN D DEFICIENCY: ICD-10-CM

## 2017-10-16 LAB — 25(OH)D3 SERPL-MCNC: 26.2 NG/ML (ref 30–100)

## 2017-10-16 PROCEDURE — 82306 VITAMIN D 25 HYDROXY: CPT | Performed by: NURSE PRACTITIONER

## 2017-10-16 PROCEDURE — 36415 COLL VENOUS BLD VENIPUNCTURE: CPT | Performed by: NURSE PRACTITIONER

## 2017-10-16 PROCEDURE — 99213 OFFICE O/P EST LOW 20 MIN: CPT | Performed by: NURSE PRACTITIONER

## 2017-10-16 RX ORDER — ERGOCALCIFEROL 1.25 MG/1
50000 CAPSULE ORAL WEEKLY
Qty: 4 CAPSULE | Refills: 3 | Status: SHIPPED | OUTPATIENT
Start: 2017-10-16

## 2017-10-16 NOTE — PROGRESS NOTES
"Subjective   Felix Hernandez is a 51 y.o. male.     History of Present Illness   Felix Hernandez is here with morbid obesity and desires to have surgery for weight loss. This is the patient's 5th visit to the office.  Patient has been evaluated once and [facility] and is due to have second part of evaluation done in November. He has had his EGD done with Dr. Jasso with negative path report. He is due to have cardiac referral placed today. He would like to see Dr. West in [place], [place] for this. He has been wearing his CPAP machine nightly for sleep apnea.  Patient has been exercising by doing squats, doing push ups, crunches, lunges, and weight training for 20 minutes daily. Patient has been making health dietary choices and eating 3 meals per day with protein at each. He is getting 100 grams of protein per day. Patient is drinking 40-60 ounces of water per day.   Vitals:    10/16/17 0840   BP: 123/69   BP Location: Right arm   Patient Position: Sitting   Cuff Size: Adult   Pulse: 76   Temp: 96.9 °F (36.1 °C)   SpO2: 99%   Weight: (!) 355 lb (161 kg)   Height: 69\" (175.3 cm)         The following portions of the patient's history were reviewed and updated as appropriate: allergies, current medications, past family history, past medical history, past social history, past surgical history and problem list.    Review of Systems   Constitutional: Positive for activity change. Negative for appetite change and fatigue.   HENT: Negative.    Eyes: Negative.    Respiratory: Positive for apnea. Negative for cough, chest tightness and shortness of breath.         Using CPAP machine   Cardiovascular: Negative.  Negative for chest pain, palpitations and leg swelling.   Gastrointestinal: Positive for diarrhea. Negative for abdominal pain, anal bleeding, constipation, nausea and vomiting.        Occ. diarrhea   Endocrine: Negative.         Fasting blood sugar is 115 mg/dL   Genitourinary: Negative.    Musculoskeletal: " Positive for arthralgias. Negative for back pain.   Skin: Negative.    Allergic/Immunologic: Negative.    Neurological: Negative.  Negative for seizures and syncope.   Hematological: Negative.  Does not bruise/bleed easily.   Psychiatric/Behavioral: Negative.  Negative for dysphoric mood, self-injury and sleep disturbance.       Objective   Physical Exam   Constitutional: He is oriented to person, place, and time. Vital signs are normal. He appears well-developed and well-nourished. He is cooperative. No distress.   HENT:   Head: Normocephalic and atraumatic.   Nose: Nose normal.   Mouth/Throat: Oropharynx is clear and moist. No oropharyngeal exudate or tonsillar abscesses.   Eyes: Conjunctivae, EOM and lids are normal. Pupils are equal, round, and reactive to light. Right eye exhibits no discharge. Left eye exhibits no discharge.   Glasses noted   Neck: Trachea normal. Neck supple. No JVD present. Carotid bruit is not present. No rigidity. No tracheal deviation present. No thyromegaly present.   Cardiovascular: Normal rate, regular rhythm, S1 normal, S2 normal and normal heart sounds.    Pulmonary/Chest: Effort normal and breath sounds normal. No stridor. No respiratory distress. He has no wheezes. He has no rales.   Abdominal: Soft. Bowel sounds are normal. He exhibits no distension. There is no tenderness.   obese   Musculoskeletal: He exhibits no edema.        Right shoulder: He exhibits normal strength.   Lymphadenopathy:     He has no cervical adenopathy.   Neurological: He is alert and oriented to person, place, and time. He has normal strength. No cranial nerve deficit.   Skin: Skin is warm, dry and intact. No rash noted.   Psychiatric: He has a normal mood and affect. His speech is normal and behavior is normal.   Alert and oriented x 3   Vitals reviewed.      Assessment/Plan   Felix was seen today for follow-up, obesity, nutrition counseling and weight loss.    Diagnoses and all orders for this  visit:    Obesity, morbid, BMI 50 or higher  -     Vitamin D 25 Hydroxy  -     Ambulatory Referral to Cardiology    Obstructive sleep apnea syndrome  -     Ambulatory Referral to Cardiology    Vitamin D deficiency  -     Vitamin D 25 Hydroxy    Essential hypertension  -     Ambulatory Referral to Cardiology    Pre-op evaluation  -     Ambulatory Referral to Cardiology              Felix RAMEY David has done really good this month with healthy changes. Patient has lost 18  pounds. Today we discussed healthy changes in lifestyle, diet, and exercise.   They will meet with the dietician today.   Intensive behavioral therapy for obesity was done today.   Goals for this month are: 3 meals per day with protein at each; eat protein first, use smaller plate; exercise as advised.  Weight proofing your home handout provided today.   Continue to wear CPAP machine for sleep apnea.  Get Vitamin D level checked today. Office will call with results.   Cardiac referral was made today to Dr. West (patient request) for cardiac evaluation and clearance prior to surgery. Office will arrange.   Keep second part of psych clearance evaluation for later in November.   Follow up in one month for a weight recheck.

## 2017-10-16 NOTE — PATIENT INSTRUCTIONS
Felix Hernandez has done really good this month with healthy changes. Patient has lost 18  pounds. Today we discussed healthy changes in lifestyle, diet, and exercise.   They will meet with the dietician today.   Intensive behavioral therapy for obesity was done today.   Goals for this month are: 3 meals per day with protein at each; eat protein first, use smaller plate; exercise as advised.  Weight proofing your home handout provided today.   Continue to wear CPAP machine for sleep apnea.  Get Vitamin D level checked today. Office will call with results.   Cardiac referral was made today to Dr. West (patient request) for cardiac evaluation and clearance prior to surgery. Office will arrange.   Keep second part of psych clearance evaluation for later in November.   Follow up in one month for a weight recheck.

## 2017-10-17 ENCOUNTER — TELEPHONE (OUTPATIENT)
Dept: BARIATRICS/WEIGHT MGMT | Facility: CLINIC | Age: 52
End: 2017-10-17

## 2017-10-17 NOTE — TELEPHONE ENCOUNTER
Patient was informed of his appt with Dr West on 10/24/2017 at 10:00am for Bariatric Surgery clearance. He was agreeable to date and time

## 2017-11-03 ENCOUNTER — RESULTS ENCOUNTER (OUTPATIENT)
Dept: BARIATRICS/WEIGHT MGMT | Facility: CLINIC | Age: 52
End: 2017-11-03

## 2017-11-03 DIAGNOSIS — E66.9 SUPER OBESE: ICD-10-CM

## 2017-11-13 ENCOUNTER — OFFICE VISIT (OUTPATIENT)
Dept: BARIATRICS/WEIGHT MGMT | Facility: CLINIC | Age: 52
End: 2017-11-13

## 2017-11-13 ENCOUNTER — TELEPHONE (OUTPATIENT)
Dept: PSYCHIATRY | Age: 52
End: 2017-11-13

## 2017-11-13 VITALS
HEART RATE: 66 BPM | HEIGHT: 69 IN | BODY MASS INDEX: 46.65 KG/M2 | SYSTOLIC BLOOD PRESSURE: 154 MMHG | OXYGEN SATURATION: 100 % | WEIGHT: 315 LBS | DIASTOLIC BLOOD PRESSURE: 76 MMHG | TEMPERATURE: 96.8 F

## 2017-11-13 DIAGNOSIS — E66.01 OBESITY, MORBID, BMI 50 OR HIGHER (HCC): Primary | ICD-10-CM

## 2017-11-13 DIAGNOSIS — E55.9 VITAMIN D DEFICIENCY: ICD-10-CM

## 2017-11-13 PROCEDURE — 99212 OFFICE O/P EST SF 10 MIN: CPT | Performed by: NURSE PRACTITIONER

## 2017-11-13 NOTE — PROGRESS NOTES
"Subjective   Felix Hernandez is a 51 y.o. male.     History of Present Illness   Felix Hernandez is here with morbid obesity and desires to have surgery for weight loss. This is the patient's 6th  visit to the office.  Patient has his second part of his psych evaluation this next week. He has seen Dr. West, cardiologist, and is awaiting stress echo testing prior to final clearance. He has had EGD with negative path report. He is currently on Vitamin D supplement and that level will be rechecked in January.  Patient has been exercising by doing push ups, crunches, and squats for 15 minutes every day. He just rejoined the gym as well. Patient has been making health dietary choices and eating 3 meals per day with protein at each. Patient has been getting at least 60 grams of protein per day.  Patient is drinking 64 ounces of water per day at the very least.   Vitals:    11/13/17 0836   BP: 154/76   BP Location: Right arm   Patient Position: Sitting   Cuff Size: Adult   Pulse: 66   Temp: 96.8 °F (36 °C)   SpO2: 100%   Weight: (!) 346 lb 6.4 oz (157 kg)   Height: 69\" (175.3 cm)         The following portions of the patient's history were reviewed and updated as appropriate: allergies, current medications, past family history, past medical history, past social history, past surgical history and problem list.    Review of Systems   Constitutional: Positive for fatigue. Negative for activity change and appetite change.   HENT: Negative.    Eyes: Negative.    Respiratory: Negative.  Negative for apnea, cough, chest tightness and shortness of breath.    Cardiovascular: Negative.  Negative for chest pain, palpitations and leg swelling.   Gastrointestinal: Negative.  Negative for abdominal pain, anal bleeding, constipation, nausea and vomiting.   Endocrine: Negative.    Genitourinary: Negative.    Musculoskeletal: Positive for arthralgias. Negative for back pain.   Skin: Negative.    Allergic/Immunologic: Negative.  "   Neurological: Negative.  Negative for seizures and syncope.   Hematological: Negative.  Does not bruise/bleed easily.   Psychiatric/Behavioral: Positive for sleep disturbance. Negative for dysphoric mood and self-injury.       Objective   Physical Exam   Constitutional: He is oriented to person, place, and time. Vital signs are normal. He appears well-developed and well-nourished. He is cooperative. No distress.   HENT:   Head: Normocephalic and atraumatic.   Nose: Nose normal.   Mouth/Throat: Oropharynx is clear and moist. No oropharyngeal exudate or tonsillar abscesses.   Eyes: Conjunctivae, EOM and lids are normal. Pupils are equal, round, and reactive to light. Right eye exhibits no discharge. Left eye exhibits no discharge.   Glasses noted    Neck: Trachea normal. Neck supple. No JVD present. Carotid bruit is not present. No rigidity. No tracheal deviation present. No thyromegaly present.   Cardiovascular: Normal rate, regular rhythm, S1 normal, S2 normal and normal heart sounds.    Pulmonary/Chest: Effort normal and breath sounds normal. No stridor. No respiratory distress. He has no wheezes. He has no rales.   Abdominal: Soft. Bowel sounds are normal. He exhibits no distension. There is no tenderness.   obese   Musculoskeletal: He exhibits no edema.        Right shoulder: He exhibits normal strength.   Lymphadenopathy:     He has no cervical adenopathy.   Neurological: He is alert and oriented to person, place, and time. He has normal strength. No cranial nerve deficit.   Skin: Skin is warm, dry and intact. No rash noted.   Psychiatric: He has a normal mood and affect. His speech is normal and behavior is normal.   Alert and oriented x 3   Vitals reviewed.      Assessment/Plan   Felix was seen today for follow-up, obesity, nutrition counseling and weight loss.    Diagnoses and all orders for this visit:    Obesity, morbid, BMI 50 or higher    Vitamin D deficiency          Felix Hernandez has done well  this month with healthy changes. Patient has lost 10 pounds. Today we discussed healthy changes in lifestyle, diet, and exercise.   Handout provided on Mindful eating.   Intensive behavioral therapy for obesity was done today.   Goals for this month are: 3 meals per day with protein at each; eat protein first, use smaller plate; exercise as advised.  For vitamin D deficiency, continue to take Vitamin D supplement. Will recheck level in January.   Keep appt with psych for second part of evaluation.  Keep appt with Dr. West for stress echo and final clearance.   Follow up in one month with Dr. Jasso to discuss surgery submission.

## 2017-11-13 NOTE — TELEPHONE ENCOUNTER
Patient called and left vm wanting information on his appointment time. Pt's mailbox was full, also called spouse and that mailbox was full. Could not leave a message. Pt has New Patient appointment on 11-15-17 with KALEN Jauregui @ 9:00 am.

## 2017-11-13 NOTE — PATIENT INSTRUCTIONS
Felix Hernandez has done well this month with healthy changes. Patient has lost 10 pounds. Today we discussed healthy changes in lifestyle, diet, and exercise.   Handout provided on Mindful eating.   Intensive behavioral therapy for obesity was done today.   Goals for this month are: 3 meals per day with protein at each; eat protein first, use smaller plate; exercise as advised.  For vitamin D deficiency, continue to take Vitamin D supplement. Will recheck level in January.   Keep appt with psych for second part of evaluation.  Keep appt with Dr. West for stress echo and final clearance.   Follow up in one month with Dr. Jasso to discuss surgery submission.

## 2017-11-15 ENCOUNTER — OFFICE VISIT (OUTPATIENT)
Dept: PSYCHIATRY | Age: 52
End: 2017-11-15
Payer: MEDICAID

## 2017-11-15 VITALS
WEIGHT: 315 LBS | SYSTOLIC BLOOD PRESSURE: 140 MMHG | HEART RATE: 80 BPM | OXYGEN SATURATION: 99 % | BODY MASS INDEX: 46.65 KG/M2 | DIASTOLIC BLOOD PRESSURE: 88 MMHG | HEIGHT: 69 IN

## 2017-11-15 DIAGNOSIS — E66.01 MORBID OBESITY WITH BODY MASS INDEX OF 50.0-59.9 IN ADULT (HCC): ICD-10-CM

## 2017-11-15 PROCEDURE — 99999 PR OFFICE/OUTPT VISIT,PROCEDURE ONLY: CPT | Performed by: NURSE PRACTITIONER

## 2017-11-15 PROCEDURE — 96151 PR HEAL & BEHAV ASSESS,EA 15 MIN,RE-ASSESS: CPT | Performed by: NURSE PRACTITIONER

## 2017-11-15 NOTE — LETTER
[address] [facility]  Nevin Smith  [phone] Option 3      11/15/2017      Cristina Brown  1965      To Whom It May Concern,    James Day has been evaluated by our office for potential bariatric surgery. Our clinical impression is that the above patient is currently psychologically stable and motivated to participate in a bariatric surgical procedure. This patient is aware of the time frame involved in the healing and convalescent period and has an adequate support system available. Psychiatric issues including depression and psychosis are not present at this time. Therefore, James Day appears to be a candidate for the bariatric procedure. If we can be of further assistance in this patients care, please contact our office. Sincerely,        BLANCA Dobson Art, [address] Erminia Saint Ed.D.[email or web address], Medical Director

## 2017-12-01 ENCOUNTER — RESULTS ENCOUNTER (OUTPATIENT)
Dept: BARIATRICS/WEIGHT MGMT | Facility: CLINIC | Age: 52
End: 2017-12-01

## 2017-12-01 DIAGNOSIS — E66.9 SUPER OBESE: ICD-10-CM

## 2017-12-04 ENCOUNTER — OFFICE VISIT (OUTPATIENT)
Dept: BARIATRICS/WEIGHT MGMT | Facility: CLINIC | Age: 52
End: 2017-12-04

## 2017-12-04 VITALS
TEMPERATURE: 97.6 F | HEIGHT: 69 IN | DIASTOLIC BLOOD PRESSURE: 78 MMHG | OXYGEN SATURATION: 98 % | HEART RATE: 63 BPM | WEIGHT: 315 LBS | BODY MASS INDEX: 46.65 KG/M2 | SYSTOLIC BLOOD PRESSURE: 136 MMHG

## 2017-12-04 DIAGNOSIS — R06.09 DOE (DYSPNEA ON EXERTION): ICD-10-CM

## 2017-12-04 DIAGNOSIS — K21.9 GASTROESOPHAGEAL REFLUX DISEASE, ESOPHAGITIS PRESENCE NOT SPECIFIED: ICD-10-CM

## 2017-12-04 DIAGNOSIS — G47.33 OBSTRUCTIVE SLEEP APNEA SYNDROME: ICD-10-CM

## 2017-12-04 PROCEDURE — 99213 OFFICE O/P EST LOW 20 MIN: CPT | Performed by: SURGERY

## 2017-12-04 NOTE — PROGRESS NOTES
Patient Care Team:  Mike Wesley MD as PCP - General (General Practice)    Reason for Visit:  Surgical Weight loss    Subjective     Patient is a 52 y.o. male presents with morbid obesity and his Body mass index is 51.3 kg/(m^2).     He is here for discussion of surgical weight loss options.  He stated he has been with the disease of obesity for year(s).  He stated he suffers from hypertension, sleep apnea and reflux due to his weight gain.  He stated that weight loss helps alleviate these symptoms.   He stated that he has tried multiple diet regimens including participating in a medically supervised weight loss program to help with weight loss.  He stated that he has attempted these conservative methods for weight loss without maintaining long term success.  Today he would like to discuss surgical weight loss options such as the Laparoscopic Sleeve Gastrectomy or the Laparoscopic R - Y Gastric Bypass.     Review of Systems  General ROS: negative  Respiratory ROS: no cough, shortness of breath, or wheezing  Cardiovascular ROS: no chest pain or dyspnea on exertion  Gastrointestinal ROS: no abdominal pain, change in bowel habits, or black or bloody stools  positive for - heartburn  Musculoskeletal ROS: positive for - joint pain  Neurological ROS: no TIA or stroke symptoms    History  Past Medical History:   Diagnosis Date   • Ankle swelling    • Salinas's esophagus     recent EGD negative path report   • Carpal tunnel syndrome    • Generalized pain     joint, knee, carpal tunnel   • GERD (gastroesophageal reflux disease)    • Hair loss    • Heart burn    • Hypertension    • Insomnia    • Night sweats    • No energy    • Pre-diabetes    • Rapid heart beat    • Recurrent boils    • Restless leg syndrome    • Sleep apnea    • Sleep apnea with use of continuous positive airway pressure (CPAP)    • Snoring    • Weight gain      Past Surgical History:   Procedure Laterality Date   • APPENDECTOMY      lap   • ENDOSCOPY  N/A 9/12/2017    Procedure: ESOPHAGOGASTRODUODENOSCOPY WITH ANESTHESIA;  Surgeon: Jorge Jasso MD;  Location: [facility] ENDOSCOPY;  Service:      Family History   Problem Relation Age of Onset   • Obesity Mother    • Diabetes Mother 27   • Hypertension Mother    • Sleep apnea Mother    • Obesity Father    • Diabetes Father 65   • Hypertension Father    • Other Father    • Stroke Maternal Grandmother 80   • Obesity Maternal Grandfather    • Diabetes Maternal Grandfather    • Hypertension Maternal Grandfather    • Heart attack Maternal Grandfather    • Obesity Paternal Grandmother    • Diabetes Paternal Grandmother    • Hypertension Paternal Grandmother    • Heart attack Paternal Grandmother    • Other Paternal Grandmother    • Lung cancer Paternal Grandfather      Social History   Substance Use Topics   • Smoking status: Former Smoker     Years: 5.00     Types: Cigarettes     Quit date: 1997   • Smokeless tobacco: Never Used   • Alcohol use 1.2 oz/week     2 Cans of beer per week      Comment: 1-2 month       (Not in a hospital admission)  Allergies:  Review of patient's allergies indicates no known allergies.    Objective     Vital Signs  Temp:  [97.6 °F (36.4 °C)] 97.6 °F (36.4 °C)  Heart Rate:  [63] 63  BP: (136)/(78) 136/78  Body mass index is 51.3 kg/(m^2).  Last 3 weights    12/04/17  0859   Weight: (!) 347 lb 6.4 oz (158 kg)       Physical Exam:     HEENT: extra ocular movement intact  Respiratory: appears well, vitals normal, no respiratory distress, acyanotic, normal RR, chest clear, no wheezing, crepitations, rhonchi, normal symmetric air entry  Cardiovascular: Regular rate and rhythm, S1, S2 normal, no murmur, click, rub or gallop  GI: Soft, non-tender, normal bowel sounds; no bruits, organomegaly or masses.  Abnormal shape: obese  Neurologic: alert, oriented, normal speech, no focal findings or movement disorder noted       Results Review:   I reviewed the patient's new clinical results.  Pathological  findings on his EGD were negative for Salinas's.        Assessment/Plan   Encounter Diagnoses   Name Primary?   • Body mass index (BMI) of 50-59.9 in adult Yes   • Gastroesophageal reflux disease, esophagitis presence not specified    • Obstructive sleep apnea syndrome    • DURAN (dyspnea on exertion)        I believe this patient will be a good candidate for weight loss surgery.    He has chosen laparoscopic sleeve gastrectomy. I agree with this decision.  I have discussed the Felix - Y Gastric Bypass, laparoscopic sleeve gastrectomy and the Laparoscopic Gastric Band procedures to provide the alternatives which includes non surgical weight loss options as well.  We discussed the benefits of the surgeries including the benefit of weight loss and the possible reversal of co-morbid conditions associated with morbid obesity.  We discussed the complications and risks which include the risk of perforation, leakage,bleeding, intra-abdominal organ injury, stenosis or ulcerations, the risk of deep vein thrombosis formation leading to possible pulmonary embolisms, the risk of death.  I explained to the patient the possibility that this procedure may not be performed laparoscopically and may require being converted to an opened procedure or aborted due to abnormal anatomy.  Also postoperatively there is a risk of increased GERD symptoms after this procedure.  Upon completion of our discussion and addressing and answering his questions to his satisfation, informed consent was obtained.   He will be scheduled accordingly for a laparoscopic Sleeve gastrectomy procedure.    I discussed the patients findings and my recommendations with patient.     Dr. Jorge Jasso MD [facility]    12/04/17  10:17 AM  Patient Care Team:  Mike Wesley MD as PCP - General (General Practice)

## 2017-12-29 ENCOUNTER — RESULTS ENCOUNTER (OUTPATIENT)
Dept: BARIATRICS/WEIGHT MGMT | Facility: CLINIC | Age: 52
End: 2017-12-29

## 2017-12-29 DIAGNOSIS — E66.9 SUPER OBESE: ICD-10-CM

## 2018-01-23 ENCOUNTER — TELEPHONE (OUTPATIENT)
Dept: BARIATRICS/WEIGHT MGMT | Facility: CLINIC | Age: 53
End: 2018-01-23

## 2018-01-23 NOTE — TELEPHONE ENCOUNTER
Called left message for patient to check on BA process.  Patient was referred to Dr West on 10-16-17.     Called left a message with Kathy/Dr West to see where patient stands on his heart clearance.        Patient called back her an apt to see Heart MD on 01-25-18 will call back cleared.          01-29-18 Kathy called from Dr West's office   Patient is scheduled for a Nuclear stress test on Feb 6th.  They will fax results once test is completed.

## 2018-01-26 ENCOUNTER — RESULTS ENCOUNTER (OUTPATIENT)
Dept: BARIATRICS/WEIGHT MGMT | Facility: CLINIC | Age: 53
End: 2018-01-26

## 2018-01-26 DIAGNOSIS — E66.9 SUPER OBESE: ICD-10-CM

## 2018-02-23 ENCOUNTER — RESULTS ENCOUNTER (OUTPATIENT)
Dept: BARIATRICS/WEIGHT MGMT | Facility: CLINIC | Age: 53
End: 2018-02-23

## 2018-02-23 DIAGNOSIS — E66.9 SUPER OBESE: ICD-10-CM

## 2018-03-23 ENCOUNTER — RESULTS ENCOUNTER (OUTPATIENT)
Dept: BARIATRICS/WEIGHT MGMT | Facility: CLINIC | Age: 53
End: 2018-03-23

## 2018-03-23 DIAGNOSIS — E66.9 SUPER OBESE: ICD-10-CM

## 2018-04-20 ENCOUNTER — RESULTS ENCOUNTER (OUTPATIENT)
Dept: BARIATRICS/WEIGHT MGMT | Facility: CLINIC | Age: 53
End: 2018-04-20

## 2018-04-20 DIAGNOSIS — E66.9 SUPER OBESE: ICD-10-CM

## 2018-04-23 ENCOUNTER — TELEPHONE (OUTPATIENT)
Dept: BARIATRICS/WEIGHT MGMT | Facility: CLINIC | Age: 53
End: 2018-04-23

## 2018-04-23 NOTE — TELEPHONE ENCOUNTER
Pt returned call stating that he is trying to get his insurance to approve the last cardiology test that we sent him for and he will r/s when that is done.

## 2018-05-18 ENCOUNTER — RESULTS ENCOUNTER (OUTPATIENT)
Dept: BARIATRICS/WEIGHT MGMT | Facility: CLINIC | Age: 53
End: 2018-05-18

## 2018-05-18 DIAGNOSIS — E66.9 SUPER OBESE: ICD-10-CM

## 2018-06-15 ENCOUNTER — RESULTS ENCOUNTER (OUTPATIENT)
Dept: BARIATRICS/WEIGHT MGMT | Facility: CLINIC | Age: 53
End: 2018-06-15

## 2018-06-15 DIAGNOSIS — E66.9 SUPER OBESE: ICD-10-CM

## 2018-07-10 ENCOUNTER — LAB (OUTPATIENT)
Dept: LAB | Facility: HOSPITAL | Age: 53
End: 2018-07-10
Attending: NURSE PRACTITIONER

## 2018-07-10 ENCOUNTER — OFFICE VISIT (OUTPATIENT)
Dept: BARIATRICS/WEIGHT MGMT | Facility: CLINIC | Age: 53
End: 2018-07-10

## 2018-07-10 VITALS
SYSTOLIC BLOOD PRESSURE: 150 MMHG | OXYGEN SATURATION: 99 % | HEART RATE: 90 BPM | DIASTOLIC BLOOD PRESSURE: 80 MMHG | BODY MASS INDEX: 46.65 KG/M2 | WEIGHT: 315 LBS | HEIGHT: 69 IN | TEMPERATURE: 98.2 F

## 2018-07-10 DIAGNOSIS — R03.0 BORDERLINE HYPERTENSION: ICD-10-CM

## 2018-07-10 DIAGNOSIS — E66.01 OBESITY, MORBID, BMI 50 OR HIGHER (HCC): Primary | ICD-10-CM

## 2018-07-10 DIAGNOSIS — E66.01 OBESITY, MORBID, BMI 50 OR HIGHER (HCC): ICD-10-CM

## 2018-07-10 DIAGNOSIS — E55.9 VITAMIN D DEFICIENCY: ICD-10-CM

## 2018-07-10 LAB — 25(OH)D3 SERPL-MCNC: 30.5 NG/ML (ref 30–100)

## 2018-07-10 PROCEDURE — 82306 VITAMIN D 25 HYDROXY: CPT | Performed by: NURSE PRACTITIONER

## 2018-07-10 PROCEDURE — 36415 COLL VENOUS BLD VENIPUNCTURE: CPT

## 2018-07-10 PROCEDURE — 99213 OFFICE O/P EST LOW 20 MIN: CPT | Performed by: NURSE PRACTITIONER

## 2018-07-10 NOTE — PROGRESS NOTES
"Subjective   Felix Hernandez is a 52 y.o. male.     History of Present Illness   Felix Hernandez is here with morbid obesity and desires to have surgery for weight loss. This is the patient's 8th visit to the office.  Patient has been cleared by psychologist. He has another appointment with cardiologist (one that has taken over for Dr. West) at the beginning of next month. He hopes to gain final evaluation and clearance for bariatric surgery at that appt. He has had EGD done in September. . Patient has been exercising by doing push ups, crunches, and squats twice per week. Patient has been making health dietary choices and eating 3 meals per day with protein at each. Patient has been eating 25-30 grams of protein per day. Patient is drinking 64 ounces of water per day. He is not drinking any soda.   Vitals:    07/10/18 1118   BP: 150/80   BP Location: Right arm   Patient Position: Sitting   Cuff Size: Adult   Pulse: 90   Temp: 98.2 °F (36.8 °C)   SpO2: 99%   Weight: (!) 161 kg (355 lb 12.8 oz)   Height: 175.3 cm (69\")         The following portions of the patient's history were reviewed and updated as appropriate: allergies, current medications, past family history, past medical history, past social history, past surgical history and problem list.    Review of Systems   Constitutional: Negative for activity change, appetite change and fatigue.   HENT: Negative.    Eyes: Negative.    Respiratory: Positive for cough. Negative for apnea, chest tightness and shortness of breath.    Cardiovascular: Negative.  Negative for chest pain, palpitations and leg swelling.   Gastrointestinal: Negative.  Negative for abdominal pain, anal bleeding, constipation, nausea and vomiting.        Heartburn   Endocrine: Negative.    Genitourinary: Negative.    Musculoskeletal: Positive for arthralgias, back pain and myalgias.   Skin: Negative.    Allergic/Immunologic: Negative.    Neurological: Negative.  Negative for seizures " and syncope.   Hematological: Negative.  Does not bruise/bleed easily.   Psychiatric/Behavioral: Negative.  Negative for dysphoric mood, self-injury and sleep disturbance.       Objective   Physical Exam   Constitutional: He is oriented to person, place, and time. Vital signs are normal. He appears well-developed and well-nourished. He is cooperative. No distress.   HENT:   Head: Normocephalic and atraumatic.   Nose: Nose normal.   Mouth/Throat: Oropharynx is clear and moist. No oropharyngeal exudate or tonsillar abscesses.   Eyes: Conjunctivae, EOM and lids are normal. Pupils are equal, round, and reactive to light. Right eye exhibits no discharge. Left eye exhibits no discharge.   Glasses noted   Neck: Trachea normal. Neck supple. No JVD present. Carotid bruit is not present. No neck rigidity. No tracheal deviation present. No thyromegaly present.   Cardiovascular: Normal rate, regular rhythm, S1 normal, S2 normal and normal heart sounds.    Pulmonary/Chest: Effort normal and breath sounds normal. No stridor. No respiratory distress. He has no wheezes. He has no rales.   Abdominal: Soft. Bowel sounds are normal. He exhibits no distension. There is no tenderness.   obese   Musculoskeletal: He exhibits no edema.        Right shoulder: He exhibits normal strength.   Lymphadenopathy:     He has no cervical adenopathy.   Neurological: He is alert and oriented to person, place, and time. He has normal strength. No cranial nerve deficit.   Skin: Skin is warm, dry and intact. No rash noted.   Psychiatric: He has a normal mood and affect. His speech is normal and behavior is normal.   Alert and oriented x 3   Vitals reviewed.      Assessment/Plan   Felix was seen today for follow-up, obesity, nutrition counseling and weight loss.    Diagnoses and all orders for this visit:    Obesity, morbid, BMI 50 or higher (CMS/HCC)    Borderline hypertension    Vitamin D deficiency          Felix Hernandez has done okay this  month with healthy changes. Patient has gained 8 pounds. Today we discussed healthy changes in lifestyle, diet, and exercise.   Intensive behavioral therapy for obesity was done today.   Goals for this month are: 3 meals per day with protein at each; eat protein first, use smaller plate; exercise as advised.  Keep appt with new cardiologist coming up first week of August for evaluation and clearance prior to surgery.   Get vitamin D level drawn. Office will call with results.  Follow up in one month with Dr. Jasso for possible surgery submission.

## 2018-07-10 NOTE — PATIENT INSTRUCTIONS
Felix Hernandez has done okay this month with healthy changes. Patient has gained 8 pounds. Today we discussed healthy changes in lifestyle, diet, and exercise.   Intensive behavioral therapy for obesity was done today.   Goals for this month are: 3 meals per day with protein at each; eat protein first, use smaller plate; exercise as advised.  Keep appt with new cardiologist coming up first week of August for evaluation and clearance prior to surgery.   Get vitamin d level drawn. Office will call with results.   Follow up in one month with Dr. Jasso for possible surgery submission.

## 2018-07-12 ENCOUNTER — TELEPHONE (OUTPATIENT)
Dept: BARIATRICS/WEIGHT MGMT | Facility: CLINIC | Age: 53
End: 2018-07-12

## 2018-07-12 NOTE — TELEPHONE ENCOUNTER
Vitamin D level is now normal. Patient has been notified to take vitamin d 3 2,000-4,000 units per day to keep level normal. He voiced understanding.

## 2018-08-17 ENCOUNTER — TELEPHONE (OUTPATIENT)
Dept: BARIATRICS/WEIGHT MGMT | Facility: CLINIC | Age: 53
End: 2018-08-17

## 2018-08-17 NOTE — TELEPHONE ENCOUNTER
Cardiac Apt     Called spoke with Valerie-Dr Ornelas (Dr West's old office)   Patient is still not cleared from Cardiac standpoint.  Dr Ornelas feels patient is in need of a stress evaluation.  However, patients insurance has denied authorization.  They are in the process of trying to find a stress evaluation that his insurance will approve.     Will follow up in 3 months with Dr Ornelas    They are faxing over his cardiac letter

## 2019-04-09 ENCOUNTER — TELEPHONE (OUTPATIENT)
Dept: BARIATRICS/WEIGHT MGMT | Facility: CLINIC | Age: 54
End: 2019-04-09

## 2019-04-09 NOTE — TELEPHONE ENCOUNTER
We received a call from Dr Ornelas office requesting a referral for cardiac clearance for BA Sx. The patient hasn't been seen in our office July 2018 and the cardiac referral was to Dr West dated 10/16/2017. We explained that the would need to be seen here for an update. They were understanding.       I called the patient to discuss the above and he was very upset that he would need to come in for an appt, possibly obtain other clearances prior to BA SX. We offered him 2 appts and he couldn't make those appts. He did say he would keep his appt with Dr Ornelas and if that went good he would call us back for an appt

## (undated) DEVICE — CONMED SCOPE SAVER BITE BLOCK, 20X27 MM: Brand: SCOPE SAVER

## (undated) DEVICE — THE CHANNEL CLEANING BRUSH IS A NYLON FLEXI BRUSH ATTACHED TO A FLEXIBLE PLASTIC SHEATH DESIGNED TO SAFELY REMOVE DEBRIS FROM FLEXIBLE ENDOSCOPES.

## (undated) DEVICE — FRCP BX RADJAW4 NDL 2.8 240 STD OG

## (undated) DEVICE — SENSR O2 OXIMAX FNGR A/ 18IN NONSTR

## (undated) DEVICE — Device: Brand: DEFENDO AIR/WATER/SUCTION AND BIOPSY VALVE

## (undated) DEVICE — CUFF,BP,DISP,1 TUBE,ADULT,HP: Brand: MEDLINE

## (undated) DEVICE — TBG SMPL FLTR LINE NASL 02/C02 A/ BX/100

## (undated) DEVICE — ENDOGATOR AUXILIARY WATER JET CONNECTOR: Brand: ENDOGATOR